# Patient Record
Sex: MALE | Race: WHITE | URBAN - METROPOLITAN AREA
[De-identification: names, ages, dates, MRNs, and addresses within clinical notes are randomized per-mention and may not be internally consistent; named-entity substitution may affect disease eponyms.]

---

## 2017-11-30 ENCOUNTER — HISTORICAL (OUTPATIENT)
Dept: ADMINISTRATIVE | Facility: HOSPITAL | Age: 52
End: 2017-11-30

## 2018-02-20 ENCOUNTER — HISTORICAL (OUTPATIENT)
Dept: LAB | Facility: HOSPITAL | Age: 53
End: 2018-02-20

## 2018-02-21 LAB — GRAM STN SPEC: NORMAL

## 2018-02-23 LAB — FINAL CULTURE: NORMAL

## 2018-02-25 LAB — FINAL CULTURE: NORMAL

## 2018-05-14 ENCOUNTER — HISTORICAL (OUTPATIENT)
Dept: ADMINISTRATIVE | Facility: HOSPITAL | Age: 53
End: 2018-05-14

## 2018-10-18 ENCOUNTER — HISTORICAL (OUTPATIENT)
Dept: ADMINISTRATIVE | Facility: HOSPITAL | Age: 53
End: 2018-10-18

## 2022-04-10 ENCOUNTER — HISTORICAL (OUTPATIENT)
Dept: ADMINISTRATIVE | Facility: HOSPITAL | Age: 57
End: 2022-04-10

## 2022-04-29 VITALS
BODY MASS INDEX: 35.08 KG/M2 | DIASTOLIC BLOOD PRESSURE: 90 MMHG | HEIGHT: 72 IN | BODY MASS INDEX: 35.08 KG/M2 | WEIGHT: 259 LBS | SYSTOLIC BLOOD PRESSURE: 137 MMHG | WEIGHT: 259 LBS | HEIGHT: 72 IN | BODY MASS INDEX: 35.08 KG/M2 | SYSTOLIC BLOOD PRESSURE: 137 MMHG | HEIGHT: 72 IN | SYSTOLIC BLOOD PRESSURE: 150 MMHG | DIASTOLIC BLOOD PRESSURE: 78 MMHG | WEIGHT: 259 LBS | DIASTOLIC BLOOD PRESSURE: 86 MMHG

## 2022-05-03 NOTE — HISTORICAL [facility] CERNER
This is a historical note converted from Anita. Formatting and pictures may have been removed.  Please reference Anita for original formatting and attached multimedia. History of Present Illness  Patient comes in today complaining of pain in his left knee.? He states he has had a previous meniscus tear in his left knee. ?He states more recently last week he has noticed increasing pain and swelling. ?He felt a small pop. ?Over the last couple days he has noticed a large effusion in his left knee.? Is tried rest medication and icing?with minimal relief. ?He has pain with bending and long standing. ?He denies any numbness or tingling he denies any complaints.  Physical Exam  Left lower extremity compartments are soft and warm. ?Skin is intact with no signs or symptoms of DVT or infection. ?He does have a 2+ effusion he is tender?both on the medial lateral aspect. ?His motion is 0-110 degrees he is stable to stressing questionable McMurrays?positive patella grind negative apprehension he walks with a slight hesitant gait he is neurovascular intact distally. ?X-rays 2 views left knee?there is trace no obvious fracture or dislocation.  Assessment/Plan  1.?Effusion, left knee  ? At this time we discussed his physical exam and x-ray findings.? We have discussed his large joint effusion as well as suspected meniscus injury. ?Under sterile technique he tolerated aspiration very well for the superior lateral portal. ?Approximately 40 cc of clear yellow fluid removed from the superior lateral portal. ?He also received a steroid lidocaine injection.? We have discussed some low impact and questioning the exercises. ?Patient states he will call or return sooner?if there is any new problems or difficulties.  Ordered:  dexamethasone, 8 mg, Intra-Articular, Once, first dose 10/18/18 14:00:00 CDT, stop date 10/18/18 14:00:00 CDT  Lidocaine inj., 5 mL, Intra-Articular, Once, first dose 10/18/18 13:33:00 CDT, stop date 10/18/18  13:33:00 CDT  1036F Current tobacco non-user, 10/18/18 13:33:00 CDT  asp/inj jnt/bursa, major [id number] PC, 10/18/18 13:33:00 CDT, [facility] [place], Routine, 10/18/18 13:33:00 CDT  Office/Outpatient Visit Level 3 Established [id number] PC, Effusion, left knee  Tear of medial meniscus of left knee  Osteoarthritis of left knee, [facility] [place], 10/18/18 13:33:00 CDT  ?  2.?Tear of medial meniscus of left knee  Ordered:  dexamethasone, 8 mg, Intra-Articular, Once, first dose 10/18/18 14:00:00 CDT, stop date 10/18/18 14:00:00 CDT  Lidocaine inj., 5 mL, Intra-Articular, Once, first dose 10/18/18 13:33:00 CDT, stop date 10/18/18 13:33:00 CDT  1036F Current tobacco non-user, 10/18/18 13:33:00 CDT  asp/inj jnt/bursa, major [id number] PC, 10/18/18 13:33:00 CDT, [facility] [place], Routine, 10/18/18 13:33:00 CDT  Office/Outpatient Visit Level 3 Established [id number] PC, Effusion, left knee  Tear of medial meniscus of left knee  Osteoarthritis of left knee, [facility] [place], 10/18/18 13:33:00 CDT  ?  3.?Osteoarthritis of left knee  Ordered:  dexamethasone, 8 mg, Intra-Articular, Once, first dose 10/18/18 14:00:00 CDT, stop date 10/18/18 14:00:00 CDT  Lidocaine inj., 5 mL, Intra-Articular, Once, first dose 10/18/18 13:33:00 CDT, stop date 10/18/18 13:33:00 CDT  1036F Current tobacco non-user, 10/18/18 13:33:00 CDT  asp/inj jnt/bursa, major [id number] PC, 10/18/18 13:33:00 CDT, [facility] [place], Routine, 10/18/18 13:33:00 CDT  Office/Outpatient Visit Level 3 Established [id number] PC, Effusion, left knee  Tear of medial meniscus of left knee  Osteoarthritis of left knee, [facility] [place], 10/18/18 13:33:00 CDT  ?  Left knee pain  ?  Orders:  naproxen, 500 mg = 1 tab(s), Oral, BID, X 14 day(s), # 28 tab(s), 0 Refill(s), Pharmacy: [facility]   Problem List/Past Medical History  Ongoing  High blood pressure  Obesity  Historical  No qualifying data  Procedure/Surgical History  left  shoulder  Tonsillectomy   Medications  dexamethasone, 8 mg, Intra-Articular, Once  Lidocaine inj., 5 mL, Intra-Articular, Once  Naprosyn 500 mg oral tablet, 500 mg= 1 tab(s), Oral, BID  OLMESARTAN MEDOX/HCTZ 40-25MG TAB, 1 tab(s), Oral, Daily  Allergies  No Known Medication Allergies  Social History  Alcohol  Never, 11/30/2017  Tobacco  Never smoker, 11/30/2017  Health Maintenance  Health Maintenance  ???Pending?(in the next year)  ??? ??OverDue  ??? ? ? ?Hypertension Management-BMP due??12/31/15??and every 1??year(s)  ??? ??Due?  ??? ? ? ?ADL Screening due??10/19/18??and every 1??year(s)  ??? ? ? ?Alcohol Misuse Screening due??10/19/18??and every 1??year(s)  ??? ? ? ?Aspirin Therapy for CVD Prevention due??10/19/18??and every 1??year(s)  ??? ? ? ?Colorectal Screening due??10/19/18??and every?  ??? ? ? ?Diabetes Screening due??10/19/18??and every?  ??? ? ? ?Influenza Vaccine due??10/19/18??and every?  ??? ? ? ?Lipid Screening due??10/19/18??and every?  ??? ? ? ?Tetanus Vaccine due??10/19/18??and every 10??year(s)  ??? ??Due In Future?  ??? ? ? ?Blood Pressure Screening not due until??05/18/19??and every 1??year(s)  ??? ? ? ?Hypertension Management-Blood Pressure not due until??05/18/19??and every 1??year(s)  ??? ? ? ?Obesity Screening not due until??05/18/19??and every 1??year(s)  ??? ? ? ?Body Mass Index Check not due until??05/31/19??and every 1??year(s)  ??? ? ? ?Depression Screening not due until??05/31/19??and every 1??year(s)  ???Satisfied?(in the past 1 year)  ??? ??Satisfied?  ??? ? ? ?Blood Pressure Screening on??05/18/18.??Satisfied by Gifty Pal LPN  ??? ? ? ?Body Mass Index Check on??05/18/18.??Satisfied by Gifty Pal LPN  ??? ? ? ?Depression Screening on??05/31/18.??Satisfied by Gladys Arceo  ??? ? ? ?Hypertension Management-Blood Pressure on??05/18/18.??Satisfied by Gifty Pal LPN  ??? ? ? ?Obesity Screening on??05/18/18.??Satisfied by Gifty Pal LPN  ?  ?

## 2022-05-03 NOTE — HISTORICAL [facility] C[facility]ER
This is a historical note converted from Anita. Formatting and pictures may have been removed.  Please reference Anita for original formatting and attached multimedia. Chief Complaint  bilateral elbow  History of Present Illness  Patient returns today complaining of bilateral elbow pain. ?He denies any specific trauma. ?He is no some posterior swelling along both elbows?for the last couple of months. ?He denies any direct trauma. ?He denies any swelling or erythema he denies any other complaints he denies any numbness or tingling.  Physical Exam  Vitals & Measurements  HR:?84?(Peripheral)? BP:?137/78?  HT:?183?cm? HT:?183?cm? WT:?117.48?kg? WT:?117.48?kg? BMI:?35.08?  Bilateral upper extremity compartments soft and warm. ?Skin is intact. ?There is no signs or symptoms of DVT or infection. ?He does have olecranon bursitis right greater than left. ?There is some mild fluid in the bursal sac. ?There is no infection. ?He has full range of motion with full flexion-extension pronation supination bilaterally. ?He stable to stressing negative Tinels?he is neurovascular intact distally. ?X-rays 2 views of the left and right elbow demonstrate no obvious fracture or dislocation.  Assessment/Plan  1.?Bilateral olecranon bursitis  ? At this time we discussed his physical exam and x-ray findings. ?Patient has olecranon bursitis right greater than left. ?We have discussed conservative and surgical intervention. ?We will hold off on an aspiration injection. ?We have discussed refraining from any direct trauma we have discussed anti-inflammatories with appropriate precautions. ?Patient states he will call or return sooner if there is any new problems or difficulties.  Ordered:  Office/Outpatient Visit Level 3 Established [id number] PC, Bilateral olecranon bursitis, [facility] [place], 11/30/17 14:29:00 CST  ?  Bilateral elbow joint pain  ?  High blood pressure  ?  Orders:  Clinic Follow-up PRN, 11/30/17 14:29:00 CST, Future Order,  [facility] [place]   Problem List/Past Medical History  Ongoing  High blood pressure  Obesity  Historical  Procedure/Surgical History  left shoulder  Tonsillectomy  Medications  OLMESARTAN MEDOX/HCTZ 40-25MG TAB, 1 tab(s), Oral, Daily  Allergies  No active allergies  Social History  Alcohol - 11/30/2017  Never  Tobacco - 11/30/2017  Never smoker

## 2022-05-03 NOTE — HISTORICAL [facility] CERNER
This is a historical note converted from Anita. Formatting and pictures may have been removed.  Please reference Anita for original formatting and attached multimedia. Chief Complaint  PT. REPORTS THAT HE STARTED WITH RIGHT KNEE PAIN FOR 3 DAYS. PT. HAS PAIN WHEN MOVING A CERTAIN WAY. PT. HAS ON KNEE BRACE AND IS AMBULATING WITH CRUTCHES TODAY.....SB  History of Present Illness  Patient comes in today complaining of pain and swelling in his right knee for the last 3 days. ?He attributes it?to walking in cowboy boots. ?He states over the weekend he has noticed increasing pain and swelling in his knee. ?He denies any specific trauma. ?He did have a previous MRI 4 years ago in his right knee.? He states occasionally?it will become caught, having to unlock his knee at times.? He denies any numbness or tingling he denies any complaints. ?He is tried rest medication, bracing without relief.  Physical Exam  Vitals & Measurements  HR:?85(Peripheral)? BP:?150/90?  HT:?183?cm? HT:?183?cm? WT:?117.48?kg? WT:?117.48?kg? BMI:?35.08?  Right lower extremity compartment soft and warm. ?Skin is intact there is no signs or symptoms of DVT or infection. ?Examination the right knee does have a 3+ effusion. ?His motion is 0-80?. ?He is stable to stressing. ?He is tender along the lateral joint line questionable lateral McMurrays. ?He walks a slight antalgic gait there is no calf tenderness?or swelling in his calf. ?He is otherwise stable to stressing he is neurovascular intact distally. ?X-rays 3 views the right knee demonstrate no obvious fracture or dislocation.  Assessment/Plan  1.?Effusion, right knee  ? At this time we discussed his physical exam and x-ray findings. ?I suspect he likely has a lateral meniscus tear.? We have discussed various treatment options. ?Under sterile technique patient tolerated aspiration approximately 80 cc of clear yellow fluid removed from the superior lateral portal. ?He also received a steroid  lidocaine injection. ?He will continue low impact and quad strengthening exercises as well as his knee brace. ?We have discussed an MRI?if his symptoms continue. ?Patient states he will call or return sooner if there is any new problems or difficulties.  Ordered:  asp/inj jnt/bursa, major [id number] PC, 05/14/18 11:29:00 CDT, [facility] [place], Routine, 05/14/18 11:29:00 CDT  Office/Outpatient Visit Level 3 Established [id number] PC, Effusion, right knee  Lateral meniscal tear, [facility]MD [facility] [place], 05/14/18 11:29:00 CDT  ?  2.?Lateral meniscal tear  Ordered:  asp/inj jnt/bursa, major [id number] PC, 05/14/18 11:29:00 CDT, [facility] [place], Routine, 05/14/18 11:29:00 CDT  Office/Outpatient Visit Level 3 Established 24811 PC, Effusion, right knee  Lateral meniscal tear, [facility]MD [facility] [place], 05/14/18 11:29:00 CDT  ?  Pain in right knee  ?  Orders:  Clinic Follow-up PRN, 05/14/18 11:29:00 CDT, Future Order, [facility] [place]   Problem List/Past Medical History  Ongoing  High blood pressure  Obesity  Historical  No qualifying data  Procedure/Surgical History  left shoulder, Tonsillectomy.  Medications  OLMESARTAN MEDOX/HCTZ 40-25MG TAB, 1 tab(s), Oral, Daily  Allergies  No Known Medication Allergies  Social History  Alcohol  Never, 11/30/2017  Tobacco  Never smoker, 11/30/2017

## 2025-05-15 ENCOUNTER — OFFICE VISIT (OUTPATIENT)
Dept: ORTHOPEDICS | Facility: CLINIC | Age: 60
End: 2025-05-15
Payer: COMMERCIAL

## 2025-05-15 ENCOUNTER — HOSPITAL ENCOUNTER (OUTPATIENT)
Dept: RADIOLOGY | Facility: CLINIC | Age: 60
Discharge: HOME | End: 2025-05-15
Attending: FAMILY MEDICINE
Payer: COMMERCIAL

## 2025-05-15 VITALS
HEIGHT: 72 IN | DIASTOLIC BLOOD PRESSURE: 84 MMHG | SYSTOLIC BLOOD PRESSURE: 138 MMHG | HEART RATE: 73 BPM | BODY MASS INDEX: 31.67 KG/M2 | WEIGHT: 233.81 LBS

## 2025-05-15 DIAGNOSIS — M79.641 RIGHT HAND PAIN: ICD-10-CM

## 2025-05-15 DIAGNOSIS — S63.601A SPRAIN OF RIGHT THUMB, INITIAL ENCOUNTER: Primary | ICD-10-CM

## 2025-05-15 DIAGNOSIS — M79.642 LEFT HAND PAIN: ICD-10-CM

## 2025-05-15 PROCEDURE — 3008F BODY MASS INDEX DOCD: CPT | Mod: CPTII,,, | Performed by: FAMILY MEDICINE

## 2025-05-15 PROCEDURE — 73130 X-RAY EXAM OF HAND: CPT | Mod: RT,,, | Performed by: FAMILY MEDICINE

## 2025-05-15 PROCEDURE — 4010F ACE/ARB THERAPY RXD/TAKEN: CPT | Mod: CPTII,,, | Performed by: FAMILY MEDICINE

## 2025-05-15 PROCEDURE — 99203 OFFICE O/P NEW LOW 30 MIN: CPT | Mod: ,,, | Performed by: FAMILY MEDICINE

## 2025-05-15 PROCEDURE — 3079F DIAST BP 80-89 MM HG: CPT | Mod: CPTII,,, | Performed by: FAMILY MEDICINE

## 2025-05-15 PROCEDURE — 1159F MED LIST DOCD IN RCRD: CPT | Mod: CPTII,,, | Performed by: FAMILY MEDICINE

## 2025-05-15 PROCEDURE — 3075F SYST BP GE 130 - 139MM HG: CPT | Mod: CPTII,,, | Performed by: FAMILY MEDICINE

## 2025-05-15 RX ORDER — EZETIMIBE 10 MG/1
10 TABLET ORAL EVERY MORNING
COMMUNITY
Start: 2024-05-21

## 2025-05-15 RX ORDER — ASPIRIN 81 MG/1
81 TABLET ORAL
COMMUNITY

## 2025-05-15 RX ORDER — TIRZEPATIDE 15 MG/.5ML
INJECTION, SOLUTION SUBCUTANEOUS
COMMUNITY

## 2025-05-15 RX ORDER — NEBIVOLOL 5 MG/1
5 TABLET ORAL
COMMUNITY

## 2025-05-15 RX ORDER — AMLODIPINE AND VALSARTAN 10; 320 MG/1; MG/1
1 TABLET ORAL
COMMUNITY
Start: 2025-04-15

## 2025-05-15 NOTE — PROGRESS NOTES
[facility] [place]  Fernando Collier MD    Patient ID: [id number]     Chief Complaint: Hand Pain (RT thumb pain - Patient reports discomfort started about 6 months ago and is only with certain movements. Reports feeling some looseness in the thumb. No numbness/tingling. No over the counter medication. No further complaints. )      History of Present Illness:     Carmelo Rahman is a 59 y.o. male here today for a sports medicine visit.    Patient presents with concerns about his right thumb, reporting discomfort in the base, particularly when shaking hands or applying pressure. He localizes the pain to the deep base of the thumb, specifically in the joint area. Pain onset was approximately six months ago, with no known injury or trauma. He experiences no daily pain but notes discomfort with certain movements or pressure. His thumb maintains full function, but he experiences discomfort when gripping objects tightly. A recent incident where a friend shook his hand caused a popping sensation that temporarily alleviated the discomfort. He engages in sports as a hobby but reports that the thumb issue does not significantly interfere with his activities. He notes slight swelling on the affected side compared to the other thumb.    He denies any triggering or inflammatory arthritis.          Past Medical History:   Diagnosis Date    Hypertension     Mixed hyperlipidemia         History reviewed. No pertinent surgical history.     Social History     Tobacco Use    Smoking status: Never     Passive exposure: Never    Smokeless tobacco: Never   Substance and Sexual Activity    Alcohol use: Not Currently    Drug use: Not on file    Sexual activity: Not on file        Current Outpatient Medications   Medication Instructions    amlodipine-valsartan (EXFORGE)  mg per tablet 1 tablet    aspirin (ECOTRIN) 81 mg    ezetimibe (ZETIA) 10 mg, Every morning    MOUNJARO 15 mg/0.5 mL PnIj SMARTSIG:15 Milligram(s) SUB-Q Once a  Week    nebivoloL (BYSTOLIC) 5 mg       Review of patient's allergies indicates:  No Known Allergies     No care team member to display     Review of Systems:     Review of Systems    12 point review of systems conducted, negative except as stated in the history of present illness. See HPI for details.    Objective:     Visit Vitals  /84 (BP Location: Right arm, Patient Position: Sitting)   Pulse 70   Ht 6' (1.829 m)   Wt 106.1 kg (233 lb 12.8 oz)   BMI 31.71 kg/m²       Physical Exam     Hands Free Examination:  Patient is awake, alert and in no acute distress.  Respirations are non labored.  No abdominal distension is noted.  No visible rashes on exposed skin.  No lower extremity edema is present.    Right Hand/Wrist:  Flexion and extension of the wrist is intact without pain, range of motion is within normal limits.   strength, OK sign, finger flexion extension and adduction are 5/5.  Sensation to light touch and radial and ulnar pulses are intact.  Right thumb:  There is no pain with palpation or stress testing of the volar plate, no pain with palpation or with stress testing of the UCL ligament.  No laxity is appreciated valgus stress, firm endpoint noted.  There is no pain with CMC grind test, no pain with MCP joint grind test.    Imaging Reviewed:   X-Rays of the right hand performed in office today show no evidence of fractures, dislocation or degenerative changes.    Assessment and Plan:     Assessment & Plan      1. Sprain of right thumb UCL ligament    -On examination today the ligament as a firm endpoint, no asymmetry noted on point of care ultrasound examination, no UCL tear appreciated.  -He will continue conservative care of this problem and avoid activities that provoke pain until this resolves.  At this time his symptoms are tolerable.  If he has worsening pain or instability symptoms we will be happy to see him back in clinic.         Fernando Collier MD  Primary Care Sports Medicine      This note was generated with the assistance of ambient listening technology. Verbal consent was obtained by the patient and accompanying visitor(s) for the recording of patient appointment to facilitate this note. I attest to having reviewed and edited the generated note for accuracy, though some syntax or spelling errors may persist. Please contact the author of this note for any clarification.